# Patient Record
Sex: FEMALE | Race: WHITE | NOT HISPANIC OR LATINO | Employment: FULL TIME
[De-identification: names, ages, dates, MRNs, and addresses within clinical notes are randomized per-mention and may not be internally consistent; named-entity substitution may affect disease eponyms.]

---

## 2017-03-07 ENCOUNTER — CHARTING TRANS (OUTPATIENT)
Dept: OTHER | Age: 47
End: 2017-03-07

## 2017-09-12 ENCOUNTER — CHARTING TRANS (OUTPATIENT)
Dept: OTHER | Age: 47
End: 2017-09-12

## 2017-09-12 ENCOUNTER — LAB SERVICES (OUTPATIENT)
Dept: OTHER | Age: 47
End: 2017-09-12

## 2017-09-15 LAB — PATH REPORT: NORMAL

## 2017-09-20 ENCOUNTER — CHARTING TRANS (OUTPATIENT)
Dept: OTHER | Age: 47
End: 2017-09-20

## 2018-02-12 ENCOUNTER — CHARTING TRANS (OUTPATIENT)
Dept: OTHER | Age: 48
End: 2018-02-12

## 2018-04-27 ENCOUNTER — TELEPHONE (OUTPATIENT)
Dept: OBGYN CLINIC | Facility: CLINIC | Age: 48
End: 2018-04-27

## 2018-04-27 NOTE — TELEPHONE ENCOUNTER
Patient would like to find out if Dr Wing Andrade does ADD testing and if not where do you recommend she goes to have that done.

## 2018-04-28 NOTE — TELEPHONE ENCOUNTER
PC with patient. Aware she should call her pcp and see if they do it or recommend who to see. Dr Cale Parks does not do it.

## 2021-01-08 ENCOUNTER — OFFICE VISIT (OUTPATIENT)
Dept: OBGYN CLINIC | Facility: CLINIC | Age: 51
End: 2021-01-08
Payer: COMMERCIAL

## 2021-01-08 VITALS
DIASTOLIC BLOOD PRESSURE: 80 MMHG | RESPIRATION RATE: 12 BRPM | BODY MASS INDEX: 23.3 KG/M2 | WEIGHT: 145 LBS | TEMPERATURE: 98 F | SYSTOLIC BLOOD PRESSURE: 118 MMHG | HEART RATE: 80 BPM | HEIGHT: 66 IN

## 2021-01-08 DIAGNOSIS — Z12.4 SCREENING FOR MALIGNANT NEOPLASM OF CERVIX: ICD-10-CM

## 2021-01-08 DIAGNOSIS — Z00.00 LABORATORY EXAM ORDERED AS PART OF ROUTINE GENERAL MEDICAL EXAMINATION: ICD-10-CM

## 2021-01-08 DIAGNOSIS — Z12.31 BREAST CANCER SCREENING BY MAMMOGRAM: ICD-10-CM

## 2021-01-08 DIAGNOSIS — Z01.419 ENCOUNTER FOR ANNUAL ROUTINE GYNECOLOGICAL EXAMINATION: Primary | ICD-10-CM

## 2021-01-08 DIAGNOSIS — Z11.51 SCREENING FOR HUMAN PAPILLOMAVIRUS: ICD-10-CM

## 2021-01-08 PROCEDURE — 3008F BODY MASS INDEX DOCD: CPT | Performed by: OBSTETRICS & GYNECOLOGY

## 2021-01-08 PROCEDURE — 3079F DIAST BP 80-89 MM HG: CPT | Performed by: OBSTETRICS & GYNECOLOGY

## 2021-01-08 PROCEDURE — 88175 CYTOPATH C/V AUTO FLUID REDO: CPT | Performed by: OBSTETRICS & GYNECOLOGY

## 2021-01-08 PROCEDURE — 99396 PREV VISIT EST AGE 40-64: CPT | Performed by: OBSTETRICS & GYNECOLOGY

## 2021-01-08 PROCEDURE — 87624 HPV HI-RISK TYP POOLED RSLT: CPT | Performed by: OBSTETRICS & GYNECOLOGY

## 2021-01-08 PROCEDURE — 3074F SYST BP LT 130 MM HG: CPT | Performed by: OBSTETRICS & GYNECOLOGY

## 2021-01-08 NOTE — PROGRESS NOTES
HPI:   Irma Calvillo is a 53year old  who presents for an annual gynecological exam.   Menses: Patient's last menstrual period was 2020. Cycle length: 30 days Flow: nl    total duration of 3 days with 1 day heavy.   Having difficulty sleep headaches  PSYCHIATRIC: denies depression or anxiety, mood is good  HEMATOLOGIC: denies history of anemia  ENDOCRINE: denies thyroid history, cold/heat intolerance  ALLERGIC: denies allergy symptoms    EXAM:     VITALS: /80 (BP Location: Right arm, P encouraged pt to maintain taking a daily women's vitamin that has both calcium and vitamin D.  - I encouraged monthly self breast exams; pt was told to perform these in the shower; she was instructed to perform these 7-10 days after her menses.   - I highly

## 2021-01-12 LAB — HPV I/H RISK 1 DNA SPEC QL NAA+PROBE: NEGATIVE

## 2021-01-13 NOTE — PROGRESS NOTES
Jobie Jeans,  Your pap smear results came back and were normal.  If you have any questions, please call our office.     Have a wonderful day,    Jacqueline Musa

## 2021-02-24 ENCOUNTER — LAB ENCOUNTER (OUTPATIENT)
Dept: LAB | Age: 51
End: 2021-02-24
Attending: OBSTETRICS & GYNECOLOGY
Payer: COMMERCIAL

## 2021-02-24 LAB
ALBUMIN SERPL-MCNC: 3.8 G/DL (ref 3.4–5)
ALBUMIN/GLOB SERPL: 1.3 {RATIO} (ref 1–2)
ALP LIVER SERPL-CCNC: 35 U/L
ALT SERPL-CCNC: 22 U/L
ANION GAP SERPL CALC-SCNC: 6 MMOL/L (ref 0–18)
AST SERPL-CCNC: 8 U/L (ref 15–37)
BASOPHILS # BLD AUTO: 0.04 X10(3) UL (ref 0–0.2)
BASOPHILS NFR BLD AUTO: 0.7 %
BILIRUB SERPL-MCNC: 0.7 MG/DL (ref 0.1–2)
BUN BLD-MCNC: 17 MG/DL (ref 7–18)
BUN/CREAT SERPL: 25 (ref 10–20)
CALCIUM BLD-MCNC: 8.6 MG/DL (ref 8.5–10.1)
CHLORIDE SERPL-SCNC: 109 MMOL/L (ref 98–112)
CHOLEST SMN-MCNC: 189 MG/DL (ref ?–200)
CO2 SERPL-SCNC: 25 MMOL/L (ref 21–32)
CREAT BLD-MCNC: 0.68 MG/DL
DEPRECATED RDW RBC AUTO: 44.9 FL (ref 35.1–46.3)
EOSINOPHIL # BLD AUTO: 0.03 X10(3) UL (ref 0–0.7)
EOSINOPHIL NFR BLD AUTO: 0.5 %
ERYTHROCYTE [DISTWIDTH] IN BLOOD BY AUTOMATED COUNT: 12.9 % (ref 11–15)
GLOBULIN PLAS-MCNC: 3 G/DL (ref 2.8–4.4)
GLUCOSE BLD-MCNC: 85 MG/DL (ref 70–99)
HCT VFR BLD AUTO: 43 %
HDLC SERPL-MCNC: 65 MG/DL (ref 40–59)
HGB BLD-MCNC: 13.8 G/DL
IMM GRANULOCYTES # BLD AUTO: 0.02 X10(3) UL (ref 0–1)
IMM GRANULOCYTES NFR BLD: 0.4 %
LDLC SERPL CALC-MCNC: 112 MG/DL (ref ?–100)
LYMPHOCYTES # BLD AUTO: 2.05 X10(3) UL (ref 1–4)
LYMPHOCYTES NFR BLD AUTO: 36 %
M PROTEIN MFR SERPL ELPH: 6.8 G/DL (ref 6.4–8.2)
MCH RBC QN AUTO: 30.5 PG (ref 26–34)
MCHC RBC AUTO-ENTMCNC: 32.1 G/DL (ref 31–37)
MCV RBC AUTO: 94.9 FL
MONOCYTES # BLD AUTO: 0.4 X10(3) UL (ref 0.1–1)
MONOCYTES NFR BLD AUTO: 7 %
NEUTROPHILS # BLD AUTO: 3.15 X10 (3) UL (ref 1.5–7.7)
NEUTROPHILS # BLD AUTO: 3.15 X10(3) UL (ref 1.5–7.7)
NEUTROPHILS NFR BLD AUTO: 55.4 %
NONHDLC SERPL-MCNC: 124 MG/DL (ref ?–130)
OSMOLALITY SERPL CALC.SUM OF ELEC: 291 MOSM/KG (ref 275–295)
PATIENT FASTING Y/N/NP: YES
PATIENT FASTING Y/N/NP: YES
PLATELET # BLD AUTO: 262 10(3)UL (ref 150–450)
POTASSIUM SERPL-SCNC: 4.2 MMOL/L (ref 3.5–5.1)
RBC # BLD AUTO: 4.53 X10(6)UL
SODIUM SERPL-SCNC: 140 MMOL/L (ref 136–145)
TRIGL SERPL-MCNC: 61 MG/DL (ref 30–149)
TSI SER-ACNC: 0.54 MIU/ML (ref 0.36–3.74)
VIT D+METAB SERPL-MCNC: 37.4 NG/ML (ref 30–100)
VLDLC SERPL CALC-MCNC: 12 MG/DL (ref 0–30)
WBC # BLD AUTO: 5.7 X10(3) UL (ref 4–11)

## 2021-02-24 PROCEDURE — 80061 LIPID PANEL: CPT | Performed by: OBSTETRICS & GYNECOLOGY

## 2021-02-24 PROCEDURE — 82306 VITAMIN D 25 HYDROXY: CPT | Performed by: OBSTETRICS & GYNECOLOGY

## 2021-02-24 PROCEDURE — 84443 ASSAY THYROID STIM HORMONE: CPT | Performed by: OBSTETRICS & GYNECOLOGY

## 2021-02-24 PROCEDURE — 85025 COMPLETE CBC W/AUTO DIFF WBC: CPT | Performed by: OBSTETRICS & GYNECOLOGY

## 2021-02-24 PROCEDURE — 80053 COMPREHEN METABOLIC PANEL: CPT | Performed by: OBSTETRICS & GYNECOLOGY

## 2021-02-24 PROCEDURE — 36415 COLL VENOUS BLD VENIPUNCTURE: CPT | Performed by: OBSTETRICS & GYNECOLOGY

## 2021-09-18 ENCOUNTER — HOSPITAL ENCOUNTER (OUTPATIENT)
Dept: MAMMOGRAPHY | Age: 51
Discharge: HOME | End: 2021-09-18
Attending: OBSTETRICS & GYNECOLOGY
Payer: COMMERCIAL

## 2021-09-18 DIAGNOSIS — Z12.31 BREAST CANCER SCREENING BY MAMMOGRAM: ICD-10-CM

## 2021-09-18 PROCEDURE — 77063 BREAST TOMOSYNTHESIS BI: CPT | Performed by: OBSTETRICS & GYNECOLOGY

## 2021-09-18 PROCEDURE — 77067 SCR MAMMO BI INCL CAD: CPT | Performed by: OBSTETRICS & GYNECOLOGY

## 2021-09-23 NOTE — PROGRESS NOTES
Kirt Stoner,    Dr. Jag Ureña is recommending that you have additional testing on your breasts due to the density of the tissue. She is recommending either a complete breast ultrasound or Molecular Breast Imaging.  You will need to call your insurance to see what

## 2021-09-30 NOTE — PROGRESS NOTES
Left message for patient to call back or respond to [employer] message regarding mammogram results. [employer] Message has not been viewed since it was sent.

## 2021-10-07 NOTE — PROGRESS NOTES
Patient aware of mammogram results and recommendation for additional testing. Advised that [employer] message was sent with all the information for her to contact her insurance company and let us know how she would like to proceed.  Patient verbalizes Susan Marrero

## 2021-12-14 ENCOUNTER — OFFICE VISIT (OUTPATIENT)
Dept: DERMATOLOGY | Age: 51
End: 2021-12-14

## 2021-12-14 DIAGNOSIS — L70.0 ACNE VULGARIS: ICD-10-CM

## 2021-12-14 DIAGNOSIS — C44.711 BASAL CELL CARCINOMA OF LOWER EXTREMITY, UNSPECIFIED LATERALITY: ICD-10-CM

## 2021-12-14 DIAGNOSIS — L73.9 FOLLICULITIS: ICD-10-CM

## 2021-12-14 DIAGNOSIS — L30.9 DERMATITIS: ICD-10-CM

## 2021-12-14 DIAGNOSIS — C44.611 BASAL CELL CARCINOMA OF UPPER EXTREMITY, UNSPECIFIED LATERALITY: Primary | ICD-10-CM

## 2021-12-14 PROCEDURE — 11103 TANGNTL BX SKIN EA SEP/ADDL: CPT | Performed by: DERMATOLOGY

## 2021-12-14 PROCEDURE — 99204 OFFICE O/P NEW MOD 45 MIN: CPT | Performed by: DERMATOLOGY

## 2021-12-14 PROCEDURE — 11102 TANGNTL BX SKIN SINGLE LES: CPT | Performed by: DERMATOLOGY

## 2021-12-14 RX ORDER — TRIAMCINOLONE ACETONIDE 0.25 MG/G
CREAM TOPICAL 2 TIMES DAILY
Qty: 15 G | Refills: 1 | Status: SHIPPED | OUTPATIENT
Start: 2021-12-14

## 2021-12-14 RX ORDER — BETAMETHASONE VALERATE 0.1 %
LOTION (ML) TOPICAL 2 TIMES DAILY
Qty: 60 ML | Refills: 2 | Status: SHIPPED | OUTPATIENT
Start: 2021-12-14

## 2021-12-16 ENCOUNTER — TELEPHONE (OUTPATIENT)
Dept: DERMATOLOGY | Age: 51
End: 2021-12-16

## 2021-12-16 LAB — PATH REPORT PLASRBC-IMP: NORMAL

## 2021-12-16 RX ORDER — MOMETASONE FUROATE 1 MG/ML
SOLUTION TOPICAL
Qty: 60 ML | Refills: 0 | Status: CANCELLED | OUTPATIENT
Start: 2021-12-16

## 2021-12-17 RX ORDER — BETAMETHASONE DIPROPIONATE 0.5 MG/ML
LOTION, AUGMENTED TOPICAL 2 TIMES DAILY
Qty: 60 ML | Refills: 2 | Status: SHIPPED | OUTPATIENT
Start: 2021-12-17

## 2021-12-27 ENCOUNTER — OFFICE VISIT (OUTPATIENT)
Dept: DERMATOLOGY | Age: 51
End: 2021-12-27

## 2021-12-27 DIAGNOSIS — C44.611 BASAL CELL CARCINOMA (BCC) OF SKIN OF UPPER EXTREMITY INCLUDING SHOULDER, UNSPECIFIED LATERALITY: Primary | ICD-10-CM

## 2021-12-27 DIAGNOSIS — L57.0 KERATOSIS, ACTINIC: ICD-10-CM

## 2021-12-27 PROCEDURE — 17262 DSTRJ MAL LES T/A/L 1.1-2.0: CPT | Performed by: DERMATOLOGY

## 2021-12-27 PROCEDURE — 11102 TANGNTL BX SKIN SINGLE LES: CPT | Performed by: DERMATOLOGY

## 2021-12-30 LAB — PATH REPORT PLASRBC-IMP: NORMAL

## 2022-01-05 ENCOUNTER — OFFICE VISIT (OUTPATIENT)
Dept: DERMATOLOGY | Age: 52
End: 2022-01-05

## 2022-01-05 DIAGNOSIS — L57.0 KERATOSIS, ACTINIC: Primary | ICD-10-CM

## 2022-01-05 PROCEDURE — 99213 OFFICE O/P EST LOW 20 MIN: CPT | Performed by: DERMATOLOGY

## 2022-01-05 RX ORDER — FLUOROURACIL 50 MG/G
CREAM TOPICAL 2 TIMES DAILY
Qty: 40 G | Refills: 0 | Status: SHIPPED | OUTPATIENT
Start: 2022-01-05

## 2022-01-10 ENCOUNTER — APPOINTMENT (OUTPATIENT)
Dept: DERMATOLOGY | Age: 52
End: 2022-01-10

## 2025-06-30 ENCOUNTER — TELEPHONE (OUTPATIENT)
Facility: CLINIC | Age: 55
End: 2025-06-30

## 2025-06-30 ENCOUNTER — OFFICE VISIT (OUTPATIENT)
Facility: CLINIC | Age: 55
End: 2025-06-30
Payer: COMMERCIAL

## 2025-06-30 VITALS
BODY MASS INDEX: 22.18 KG/M2 | SYSTOLIC BLOOD PRESSURE: 112 MMHG | HEART RATE: 72 BPM | HEIGHT: 66 IN | DIASTOLIC BLOOD PRESSURE: 66 MMHG | WEIGHT: 138 LBS

## 2025-06-30 DIAGNOSIS — R92.30 DENSE BREAST TISSUE ON MAMMOGRAM, UNSPECIFIED TYPE: ICD-10-CM

## 2025-06-30 DIAGNOSIS — Z12.39 ENCOUNTER FOR BREAST CANCER SCREENING USING NON-MAMMOGRAM MODALITY: ICD-10-CM

## 2025-06-30 DIAGNOSIS — Z12.11 COLON CANCER SCREENING: ICD-10-CM

## 2025-06-30 DIAGNOSIS — Z12.31 ENCOUNTER FOR SCREENING MAMMOGRAM FOR MALIGNANT NEOPLASM OF BREAST: ICD-10-CM

## 2025-06-30 DIAGNOSIS — D17.22 LIPOMA OF LEFT UPPER EXTREMITY: ICD-10-CM

## 2025-06-30 DIAGNOSIS — Z12.4 CERVICAL CANCER SCREENING: ICD-10-CM

## 2025-06-30 DIAGNOSIS — Z01.419 ENCOUNTER FOR WELL WOMAN EXAM WITH ROUTINE GYNECOLOGICAL EXAM: Primary | ICD-10-CM

## 2025-06-30 PROCEDURE — 87624 HPV HI-RISK TYP POOLED RSLT: CPT | Performed by: STUDENT IN AN ORGANIZED HEALTH CARE EDUCATION/TRAINING PROGRAM

## 2025-06-30 PROCEDURE — 88175 CYTOPATH C/V AUTO FLUID REDO: CPT | Performed by: STUDENT IN AN ORGANIZED HEALTH CARE EDUCATION/TRAINING PROGRAM

## 2025-06-30 PROCEDURE — 99386 PREV VISIT NEW AGE 40-64: CPT | Performed by: STUDENT IN AN ORGANIZED HEALTH CARE EDUCATION/TRAINING PROGRAM

## 2025-06-30 NOTE — PROGRESS NOTES
[facility]  Obstetrics and Gynecology   History & Physical  New Patient    Chief complaint:   Chief Complaint   Patient presents with    Wellness Visit     -WWE   - last pap 21 neg results       Subjective:     HPI: Mary Mccormack is a 55 year old  with Patient's last menstrual period was 06/15/2025 (exact date).     Here for: well woman exam. Last seen > 3 years ago. Is doing well. Last pap smear 2021 normal - ok with getting pap smear today. Is still getting regular periods - mom had a hysterectomy so unsure when she underwent menopause. She is the oldest of her sisters. She gets occasional night sweats. Sexually active with , declines STD testing.  had a vasectomy. Is due for mammogram and CRC screening.     Still has same lump on L shoulder - Dr. Landis previously gave referral to general surgeon for removal, patient did not go yet but would like new referral.     Chaperone for exam was declined.    PCP: Tracey Collier MD   Patient Care Team:  Tracey Collier MD as PCP - Hilda Jenkins MD (OBSTETRICS & GYNECOLOGY)       GYN Hx:     Period Cycle (Days): 28 days (2025  9:27 AM)  Period Duration (Days): 5-6 days (2025  9:27 AM)  Period Flow: moderate (2025  9:27 AM)  Use of Birth Control (if yes, specify type): Vasectomy (2025  9:27 AM)  Hx Prior Abnormal Pap: No (2025  9:27 AM)  Pap Date: 21 (2025  9:27 AM)  Pap Result Notes: negative (2025  9:27 AM)    Patient's last menstrual period was 06/15/2025 (exact date).  Hx Prior Abnormal Pap: No  Pap Date: 21  Pap Result Notes: negative    Last pap NILM HPV neg in 2021. Denies abn paps  HPV vaccine: aged out  Sexual history: Active? yes  Denies history of STDs.   Birth control? vasectomy  Reproductive life plan? Completed child bearing   Family history of gynecologic cancers?: MGM breast cancer when she was older, 60s-70s    Health Maintenance  Breast cancer screening: mammogram due,  ordered. Also per last mammogram in  has dense breasts thus bilateral US ordered as well   Colon cancer screening: colonoscopy due, referral given  Osteoporosis screening: DEXA scan age 66     OB History:  OB History    Para Term  AB Living   2 2 2   2   SAB IAB Ectopic Multiple Live Births       2      # Outcome Date GA Lbr Edgardo/2nd Weight Sex Type Anes PTL Lv   2 Term 01 39w1d  7 lb 15 oz (3.6 kg) F NORMAL SPONT   DIAMOND   1 Term 12/10/96 40w0d  8 lb 12 oz (3.969 kg) M NORMAL SPONT   DIAMOND       Meds:  Medications Ordered Prior to Encounter[1]    All:  Allergies[2]    PMH:  Past Medical History[3]     PSH:  Past Surgical History[4]    Social History:  Short Social Hx on File[5]     Family History:  Family History[6]    Immunization History:    There is no immunization history on file for this patient.    Depression Scale      PHQ-2 SCORE: 0  , done 2025   Last [place] Suicide Screening on 2025 was No Risk.      Constitutional:  Denies fatigue, night sweats, hot flashes  Eyes:  denies blurred or double vision  Cardiovascular:  denies chest pain or palpitations  Respiratory:  denies shortness of breath  Gastrointestinal:  denies heartburn, abdominal pain, diarrhea or constipation  Genitourinary:  denies dysuria, incontinence, abnormal vaginal discharge, vaginal itching  Musculoskeletal:  denies back pain.  Skin/Breast:  Denies any breast pain, lumps, or discharge.   Neurological:  denies headaches, extremity weakness or numbness.  Psychiatric: denies depression or anxiety.  Endocrine:   denies excessive thirst or urination.  Heme/Lymph:  denies history of anemia, easy bruising or bleeding.    Objective:     Vitals:    25 0925   BP: 112/66   Pulse: 72   Weight: 138 lb (62.6 kg)   Height: 66\"       Body mass index is 22.27 kg/m².    Physical Exam:  Constitutional: well developed, well nourished  Head/Face: normocephalic  Neck/Thyroid: thyroid symmetric, no thyromegaly, no nodules, no  adenopathy  Lymphatic: no abnormal supraclavicular or axillary adenopathy is noted  Breast: + breast implants, normal without palpable masses, tenderness, asymmetry, nipple discharge, nipple retraction or skin changes  Abdomen:  soft, nontender, nondistended, no masses  Skin/Hair: no unusual rashes or bruises  Extremities: no edema, no cyanosis  Psychiatric:  Oriented to time, place, person and situation. Appropriate mood and affect    Pelvic Exam:  External Genitalia: normal appearance, hair distribution, and no lesions  Urethral Meatus:  normal in size, location, without lesions and prolapse  Bladder:  No fullness, masses or tenderness  Vagina:  Normal appearance without lesions, no abnormal discharge  Cervix:  Normal without tenderness on motion  Uterus: normal in size, contour, position, mobility, without tenderness  Adnexa: normal without masses or tenderness  Perineum: normal  Anus: no hemorrhoids     Labs:  Lab Results   Component Value Date    WBC 5.7 02/24/2021    RBC 4.53 02/24/2021    HGB 13.8 02/24/2021    HCT 43.0 02/24/2021    MCV 94.9 02/24/2021    MCH 30.5 02/24/2021    MCHC 32.1 02/24/2021    RDW 12.9 02/24/2021    .0 02/24/2021        Lab Results   Component Value Date    GLU 85 02/24/2021    BUN 17 02/24/2021    BUNCREA 25.0 (H) 02/24/2021    CREATSERUM 0.68 02/24/2021    ANIONGAP 6 02/24/2021    GFRNAA 103 02/24/2021    GFRAA 119 02/24/2021    CA 8.6 02/24/2021    OSMOCALC 291 02/24/2021    ALKPHO 35 (L) 02/24/2021    AST 8 (L) 02/24/2021    ALT 22 02/24/2021    BILT 0.7 02/24/2021    TP 6.8 02/24/2021    ALB 3.8 02/24/2021    GLOBULIN 3.0 02/24/2021     02/24/2021    K 4.2 02/24/2021     02/24/2021    CO2 25.0 02/24/2021       Lab Results   Component Value Date    CHOLEST 189 02/24/2021    TRIG 61 02/24/2021    HDL 65 (H) 02/24/2021     (H) 02/24/2021    VLDL 12 02/24/2021    NONHDLC 124 02/24/2021        Lab Results   Component Value Date    TSH 0.540 02/24/2021         No results found for: \"EAG\", \"A1C\"    Imaging:  No results found.     Assessment and Plan:     Mary Mccormack is a 55 year old  female here for well woman exam     Diagnoses and all orders for this visit:    Encounter for well woman exam with routine gynecological exam    Cervical cancer screening  -     ThinPrep PAP Smear B; Future  -     Hpv High Risk , Thin Prep Collect; Future    Encounter for screening mammogram for malignant neoplasm of breast  -     [facility] ROULA 2D+3D SCREENING BILAT (CPT=77067/13131); Future    Colon cancer screening    Lipoma of left upper extremity  -     SURGERY - INTERNAL    Dense breast tissue on mammogram, unspecified type  -     US BREAST BILATERAL COMPLETE (CPT=76641-50); Future    Encounter for breast cancer screening using non-mammogram modality  -     US BREAST BILATERAL COMPLETE (CPT=76641-50); Future         #Cervical cancer screening  - Last pap smear: 2021 NILM HPV neg   - Denies abnormal  - ASCCP guidelines reviewed  - Plan to repeat pap smear today    #Left shoulder mass  - likely lipoma?  - referral to general surgeon    Health maintenance:  Contraception: partner with vasectomy  Plans for kids: completed childbearing  STD screening: decines  Breast exam: benign  Pelvic exam: benign  Mammogram: ordered + bilateral breast US for dense breasts  Colonoscopy: referral given   DEXA: 65 yo      RTC 1 year for annual exam    Kinsey Lehman MD  [facility] - OBGYN    Note to patient and family:  The  [employer] Cures Act makes medical notes available to patients in the interest of transparency.  However, please be advised that this is a medical document.  It is intended as a peer to peer communication.  It is written in medical language and may contain abbreviations or verbiage that are technical and unfamiliar.  It may appear blunt or direct.  Medical documents are intended to carry relevant information, facts as evident, and the clinical opinion of the practitioner.        [1]   Current Outpatient Medications on File Prior to Visit   Medication Sig Dispense Refill    Multiple Vitamins-Calcium (ONE-A-DAY WOMENS OR) Take 1 tablet by mouth daily.       No current facility-administered medications on file prior to visit.   [2] No Known Allergies  [3]   Past Medical History:  Diagnosis Date    Fibroids     Heavy menses 08/27/2016    pelvic ultrasound done    History of pelvic ultrasound     prominent endometrium:uterine fibroid-1.6 x 1.9 x 2.2 cm-anterior    Hx of migraines     Pap smear for cervical cancer screening 08/24/2016    10/5/2013-All negative pap smears   [4]   Past Surgical History:  Procedure Laterality Date    Cosmetic surgery  May 2014    Breast implants    Implantable breast prosthesis      Other  04/2015    breast augmentation surgery    [5]   Social History  Socioeconomic History    Marital status:    Tobacco Use    Smoking status: Never    Smokeless tobacco: Never   Vaping Use    Vaping status: Never Used   Substance and Sexual Activity    Alcohol use: Yes     Comment: 4 glasses of wine a week    Drug use: No    Sexual activity: Yes     Partners: Male     Birth control/protection: Vasectomy   Other Topics Concern    Caffeine Concern No    Stress Concern No    Weight Concern Yes     Comment: Getting older?    Special Diet No    Exercise Yes    Seat Belt Yes   [6]   Family History  Problem Relation Age of Onset    Cancer Mother         Thyroid cancer    Cancer Maternal Grandmother         Brain     Cancer Maternal Grandfather         Skin cancer    Breast Cancer Paternal Grandmother         60

## 2025-06-30 NOTE — TELEPHONE ENCOUNTER
Spoke with patient. Address & phone number for [facility] given to patient. Verbalized understanding.

## 2025-07-01 LAB — HPV E6+E7 MRNA CVX QL NAA+PROBE: NEGATIVE

## 2025-07-07 DIAGNOSIS — Z01.419 ENCOUNTER FOR WELL WOMAN EXAM WITH ROUTINE GYNECOLOGICAL EXAM: Primary | ICD-10-CM

## 2025-07-23 PROBLEM — D12.2 BENIGN NEOPLASM OF ASCENDING COLON: Status: ACTIVE | Noted: 2025-07-23

## 2025-07-23 PROBLEM — Z12.11 SPECIAL SCREENING FOR MALIGNANT NEOPLASMS, COLON: Status: ACTIVE | Noted: 2025-07-23

## 2025-07-23 PROBLEM — Z80.0 FAMILY HISTORY OF COLON CANCER: Status: ACTIVE | Noted: 2025-07-23

## 2025-07-30 ENCOUNTER — HOSPITAL ENCOUNTER (OUTPATIENT)
Dept: MAMMOGRAPHY | Age: 55
Discharge: HOME | End: 2025-07-30
Attending: STUDENT IN AN ORGANIZED HEALTH CARE EDUCATION/TRAINING PROGRAM

## 2025-07-30 DIAGNOSIS — Z12.31 ENCOUNTER FOR SCREENING MAMMOGRAM FOR MALIGNANT NEOPLASM OF BREAST: ICD-10-CM

## 2025-07-30 PROCEDURE — 77067 SCR MAMMO BI INCL CAD: CPT | Performed by: STUDENT IN AN ORGANIZED HEALTH CARE EDUCATION/TRAINING PROGRAM

## 2025-07-30 PROCEDURE — 77063 BREAST TOMOSYNTHESIS BI: CPT | Performed by: STUDENT IN AN ORGANIZED HEALTH CARE EDUCATION/TRAINING PROGRAM
